# Patient Record
Sex: FEMALE | URBAN - METROPOLITAN AREA
[De-identification: names, ages, dates, MRNs, and addresses within clinical notes are randomized per-mention and may not be internally consistent; named-entity substitution may affect disease eponyms.]

---

## 2014-11-25 LAB — CRC RECOMMENDATION EXT: NORMAL

## 2017-01-18 ENCOUNTER — HISTORICAL (OUTPATIENT)
Dept: ADMINISTRATIVE | Facility: HOSPITAL | Age: 62
End: 2017-01-18

## 2017-02-07 ENCOUNTER — HISTORICAL (OUTPATIENT)
Dept: CARDIOLOGY | Facility: HOSPITAL | Age: 62
End: 2017-02-07

## 2017-03-30 ENCOUNTER — HISTORICAL (OUTPATIENT)
Dept: ADMINISTRATIVE | Facility: HOSPITAL | Age: 62
End: 2017-03-30

## 2017-03-30 LAB
ABS NEUT (OLG): 2.12 X10(3)/MCL
ALBUMIN SERPL-MCNC: 4.5 GM/DL (ref 3.4–5)
ALBUMIN/GLOB SERPL: 2 {RATIO}
ALP SERPL-CCNC: 63 UNIT/L (ref 20–120)
ALT SERPL-CCNC: 23 UNIT/L
ANISOCYTOSIS BLD QL SMEAR: ABNORMAL
AST SERPL-CCNC: 28 UNIT/L
BASOPHILS NFR BLD MANUAL: 0 %
BILIRUB SERPL-MCNC: 0.6 MG/DL
BILIRUBIN DIRECT+TOT PNL SERPL-MCNC: <0.1 MG/DL
BILIRUBIN DIRECT+TOT PNL SERPL-MCNC: >0.5 MG/DL
BUN SERPL-MCNC: 22 MG/DL (ref 7–25)
CALCIUM SERPL-MCNC: 9.1 MG/DL (ref 8.4–10.3)
CHLORIDE SERPL-SCNC: 102 MMOL/L (ref 96–110)
CHOLEST SERPL-MCNC: 287 MG/DL
CHOLEST/HDLC SERPL: 7 {RATIO} (ref 0–4.4)
CO2 SERPL-SCNC: 28 MMOL/L (ref 24–32)
CREAT SERPL-MCNC: 0.78 MG/DL (ref 0.7–1.1)
EOSINOPHIL NFR BLD MANUAL: 7 %
ERYTHROCYTE [DISTWIDTH] IN BLOOD BY AUTOMATED COUNT: 13.2 % (ref 11.5–14.5)
EST. AVERAGE GLUCOSE BLD GHB EST-MCNC: 117 MG/DL
GLOBULIN SER-MCNC: 2.7 GM/ML (ref 2.3–3.5)
GLUCOSE SERPL-MCNC: 101 MG/DL (ref 65–99)
GRANULOCYTES NFR BLD MANUAL: 40 %
HBA1C MFR BLD: 5.7 % (ref 4.7–5.6)
HCT VFR BLD AUTO: 41.1 % (ref 35–46)
HDLC SERPL-MCNC: 41 MG/DL
HGB BLD-MCNC: 13.4 GM/DL (ref 12–16)
HYPOCHROMIA BLD QL SMEAR: ABNORMAL
LDLC SERPL CALC-MCNC: 212 MG/DL (ref 0–130)
LYMPHOCYTES NFR BLD MANUAL: 31 %
LYMPHOCYTES NFR BLD MANUAL: 7 %
MACROCYTES BLD QL SMEAR: ABNORMAL
MCH RBC QN AUTO: 28.7 PG (ref 26–34)
MCHC RBC AUTO-ENTMCNC: 32.6 GM/DL (ref 31–37)
MCV RBC AUTO: 88 FL (ref 80–100)
MICROCYTES BLD QL SMEAR: ABNORMAL
MONOCYTES NFR BLD MANUAL: 12 %
NEUTS BAND NFR BLD MANUAL: 3 %
PLATELET # BLD AUTO: 251 X10(3)/MCL (ref 130–400)
PLATELET # BLD EST: ADEQUATE 10*3/UL
PMV BLD AUTO: 9.9 FL (ref 7.4–10.4)
POIKILOCYTOSIS BLD QL SMEAR: ABNORMAL
POTASSIUM SERPL-SCNC: 4.2 MMOL/L (ref 3.6–5.2)
PROT SERPL-MCNC: 7.2 GM/DL (ref 6–8)
RBC # BLD AUTO: 4.67 X10(6)/MCL (ref 4–5.2)
RBC MORPH BLD: ABNORMAL
SODIUM SERPL-SCNC: 138 MMOL/L (ref 135–146)
SPHEROCYTES BLD QL SMEAR: ABNORMAL
T4 FREE SERPL-MCNC: 0.73 NG/ML (ref 0.6–1.15)
T4 SERPL-MCNC: 6.7 MCG/DL (ref 6.09–12.23)
TRIGL SERPL-MCNC: 168 MG/DL
TSH SERPL-ACNC: 26.78 MIU/ML (ref 0.5–5)
VLDLC SERPL CALC-MCNC: 34 MG/DL
WBC # SPEC AUTO: 4.7 X10(3)/MCL (ref 4.5–11)

## 2018-01-04 ENCOUNTER — HISTORICAL (OUTPATIENT)
Dept: ADMINISTRATIVE | Facility: HOSPITAL | Age: 63
End: 2018-01-04

## 2018-01-04 LAB
ALBUMIN SERPL-MCNC: 4.1 GM/DL (ref 3.4–5)
ALBUMIN/GLOB SERPL: 1 RATIO (ref 1–2)
ALP SERPL-CCNC: 108 UNIT/L (ref 45–117)
ALT SERPL-CCNC: 64 UNIT/L (ref 12–78)
AST SERPL-CCNC: 42 UNIT/L (ref 15–37)
BILIRUB SERPL-MCNC: 0.5 MG/DL (ref 0.2–1)
BILIRUBIN DIRECT+TOT PNL SERPL-MCNC: <0.1 MG/DL
BILIRUBIN DIRECT+TOT PNL SERPL-MCNC: ABNORMAL MG/DL
BUN SERPL-MCNC: 20 MG/DL (ref 7–18)
CALCIUM SERPL-MCNC: 9.4 MG/DL (ref 8.5–10.1)
CHLORIDE SERPL-SCNC: 99 MMOL/L (ref 98–107)
CHOLEST SERPL-MCNC: 326 MG/DL
CHOLEST/HDLC SERPL: 8.2 {RATIO} (ref 0–4.4)
CO2 SERPL-SCNC: 31 MMOL/L (ref 21–32)
CREAT SERPL-MCNC: 0.9 MG/DL (ref 0.6–1.3)
EST. AVERAGE GLUCOSE BLD GHB EST-MCNC: 123 MG/DL
GLOBULIN SER-MCNC: 4.2 GM/ML (ref 2.3–3.5)
GLUCOSE SERPL-MCNC: 92 MG/DL (ref 74–106)
HBA1C MFR BLD: 5.9 % (ref 4.2–6.3)
HDLC SERPL-MCNC: 40 MG/DL
LDLC SERPL CALC-MCNC: 208 MG/DL (ref 0–130)
POTASSIUM SERPL-SCNC: 4.4 MMOL/L (ref 3.5–5.1)
PROT SERPL-MCNC: 8.3 GM/DL (ref 6.4–8.2)
SODIUM SERPL-SCNC: 138 MMOL/L (ref 136–145)
T4 FREE SERPL-MCNC: 0.67 NG/DL (ref 0.76–1.46)
T4 SERPL-MCNC: 6.6 MCG/DL (ref 4.8–13.9)
TRIGL SERPL-MCNC: 389 MG/DL
TSH SERPL-ACNC: 96.6 MIU/L (ref 0.36–3.74)
VLDLC SERPL CALC-MCNC: 78 MG/DL

## 2018-01-11 ENCOUNTER — HISTORICAL (OUTPATIENT)
Dept: FAMILY MEDICINE | Facility: CLINIC | Age: 63
End: 2018-01-11

## 2018-04-16 ENCOUNTER — HISTORICAL (OUTPATIENT)
Dept: INTERNAL MEDICINE | Facility: CLINIC | Age: 63
End: 2018-04-16

## 2018-04-16 LAB
T4 FREE SERPL-MCNC: 1.12 NG/DL (ref 0.76–1.46)
TSH SERPL-ACNC: 1.87 MIU/L (ref 0.36–3.74)

## 2018-04-26 ENCOUNTER — HISTORICAL (OUTPATIENT)
Dept: ADMINISTRATIVE | Facility: HOSPITAL | Age: 63
End: 2018-04-26

## 2018-04-26 LAB
ALBUMIN SERPL-MCNC: 3.9 GM/DL (ref 3.4–5)
ALBUMIN/GLOB SERPL: 1 RATIO (ref 1–2)
ALP SERPL-CCNC: 79 UNIT/L (ref 45–117)
ALT SERPL-CCNC: 43 UNIT/L (ref 12–78)
AST SERPL-CCNC: 30 UNIT/L (ref 15–37)
BILIRUB SERPL-MCNC: 0.3 MG/DL (ref 0.2–1)
BILIRUBIN DIRECT+TOT PNL SERPL-MCNC: <0.1 MG/DL
BILIRUBIN DIRECT+TOT PNL SERPL-MCNC: ABNORMAL MG/DL
BUN SERPL-MCNC: 29 MG/DL (ref 7–18)
CALCIUM SERPL-MCNC: 8.6 MG/DL (ref 8.5–10.1)
CHLORIDE SERPL-SCNC: 105 MMOL/L (ref 98–107)
CHOLEST SERPL-MCNC: 199 MG/DL
CHOLEST/HDLC SERPL: 6.6 {RATIO} (ref 0–4.4)
CO2 SERPL-SCNC: 27 MMOL/L (ref 21–32)
CREAT SERPL-MCNC: 0.8 MG/DL (ref 0.6–1.3)
EST. AVERAGE GLUCOSE BLD GHB EST-MCNC: 126 MG/DL
GLOBULIN SER-MCNC: 3.9 GM/ML (ref 2.3–3.5)
GLUCOSE SERPL-MCNC: 120 MG/DL (ref 74–106)
HBA1C MFR BLD: 6 % (ref 4.2–6.3)
HDLC SERPL-MCNC: 30 MG/DL
LDLC SERPL CALC-MCNC: ABNORMAL MG/DL (ref 0–130)
POTASSIUM SERPL-SCNC: 4.6 MMOL/L (ref 3.5–5.1)
PROT SERPL-MCNC: 7.8 GM/DL (ref 6.4–8.2)
SODIUM SERPL-SCNC: 141 MMOL/L (ref 136–145)
TRIGL SERPL-MCNC: 447 MG/DL
VLDLC SERPL CALC-MCNC: ABNORMAL MG/DL

## 2018-07-13 ENCOUNTER — HISTORICAL (OUTPATIENT)
Dept: ADMINISTRATIVE | Facility: HOSPITAL | Age: 63
End: 2018-07-13

## 2018-07-13 LAB
CREAT UR-MCNC: 113 MG/DL
DEPRECATED CALCIDIOL+CALCIFEROL SERPL-MC: 22.1 NG/ML (ref 30–80)
ERYTHROCYTE [SEDIMENTATION RATE] IN BLOOD: 8 MM/HR (ref 0–20)
MICROALBUMIN UR-MCNC: 11 MG/L (ref 0–19)
MICROALBUMIN/CREAT RATIO PNL UR: 9.7 MCG/MG CR (ref 0–29)
T4 FREE SERPL-MCNC: 1.11 NG/DL (ref 0.76–1.46)
T4 SERPL-MCNC: 12.6 MCG/DL (ref 4.8–13.9)
TSH SERPL-ACNC: 0.24 MIU/L (ref 0.36–3.74)

## 2018-09-21 ENCOUNTER — HISTORICAL (OUTPATIENT)
Dept: RADIOLOGY | Facility: HOSPITAL | Age: 63
End: 2018-09-21

## 2018-12-11 ENCOUNTER — HISTORICAL (OUTPATIENT)
Dept: ADMINISTRATIVE | Facility: HOSPITAL | Age: 63
End: 2018-12-11

## 2018-12-11 LAB
ABS NEUT (OLG): 3.34 X10(3)/MCL (ref 2.1–9.2)
ALBUMIN SERPL-MCNC: 4.1 GM/DL (ref 3.4–5)
ALBUMIN/GLOB SERPL: 1 RATIO (ref 1–2)
ALP SERPL-CCNC: 98 UNIT/L (ref 45–117)
ALT SERPL-CCNC: 48 UNIT/L (ref 12–78)
AST SERPL-CCNC: 36 UNIT/L (ref 15–37)
BASOPHILS # BLD AUTO: 0.03 X10(3)/MCL
BASOPHILS NFR BLD AUTO: 0 %
BILIRUB SERPL-MCNC: 0.3 MG/DL (ref 0.2–1)
BILIRUBIN DIRECT+TOT PNL SERPL-MCNC: 0.1 MG/DL
BILIRUBIN DIRECT+TOT PNL SERPL-MCNC: 0.2 MG/DL
BUN SERPL-MCNC: 19 MG/DL (ref 7–18)
CALCIUM SERPL-MCNC: 9 MG/DL (ref 8.5–10.1)
CHLORIDE SERPL-SCNC: 106 MMOL/L (ref 98–107)
CHOLEST SERPL-MCNC: 169 MG/DL
CHOLEST/HDLC SERPL: 3.4 {RATIO} (ref 0–4.4)
CO2 SERPL-SCNC: 28 MMOL/L (ref 21–32)
CREAT SERPL-MCNC: 0.8 MG/DL (ref 0.6–1.3)
EOSINOPHIL # BLD AUTO: 0.51 X10(3)/MCL
EOSINOPHIL NFR BLD AUTO: 8 %
ERYTHROCYTE [DISTWIDTH] IN BLOOD BY AUTOMATED COUNT: 13.8 % (ref 11.5–14.5)
EST. AVERAGE GLUCOSE BLD GHB EST-MCNC: 120 MG/DL
GLOBULIN SER-MCNC: 4 GM/ML (ref 2.3–3.5)
GLUCOSE SERPL-MCNC: 96 MG/DL (ref 74–106)
HBA1C MFR BLD: 5.8 % (ref 4.2–6.3)
HCT VFR BLD AUTO: 42.3 % (ref 35–46)
HDLC SERPL-MCNC: 49 MG/DL
HGB BLD-MCNC: 13.8 GM/DL (ref 12–16)
IMM GRANULOCYTES # BLD AUTO: 0.02 10*3/UL
IMM GRANULOCYTES NFR BLD AUTO: 0 %
LDLC SERPL CALC-MCNC: 105 MG/DL (ref 0–130)
LYMPHOCYTES # BLD AUTO: 2.07 X10(3)/MCL
LYMPHOCYTES NFR BLD AUTO: 32 % (ref 13–40)
MCH RBC QN AUTO: 28.9 PG (ref 26–34)
MCHC RBC AUTO-ENTMCNC: 32.6 GM/DL (ref 31–37)
MCV RBC AUTO: 88.5 FL (ref 80–100)
MONOCYTES # BLD AUTO: 0.49 X10(3)/MCL
MONOCYTES NFR BLD AUTO: 8 % (ref 4–12)
NEUTROPHILS # BLD AUTO: 3.34 X10(3)/MCL
NEUTROPHILS NFR BLD AUTO: 52 X10(3)/MCL
PLATELET # BLD AUTO: 249 X10(3)/MCL (ref 130–400)
PMV BLD AUTO: 10.2 FL (ref 7.4–10.4)
POTASSIUM SERPL-SCNC: 3.6 MMOL/L (ref 3.5–5.1)
PROT SERPL-MCNC: 8.1 GM/DL (ref 6.4–8.2)
RBC # BLD AUTO: 4.78 X10(6)/MCL (ref 4–5.2)
SODIUM SERPL-SCNC: 140 MMOL/L (ref 136–145)
T4 FREE SERPL-MCNC: 0.92 NG/DL (ref 0.76–1.46)
TRIGL SERPL-MCNC: 76 MG/DL
TSH SERPL-ACNC: 18.3 MIU/L (ref 0.36–3.74)
VLDLC SERPL CALC-MCNC: 15 MG/DL
WBC # SPEC AUTO: 6.5 X10(3)/MCL (ref 4.5–11)

## 2019-01-15 ENCOUNTER — HISTORICAL (OUTPATIENT)
Dept: FAMILY MEDICINE | Facility: CLINIC | Age: 64
End: 2019-01-15

## 2019-01-15 LAB
ALBUMIN SERPL-MCNC: 3.9 GM/DL (ref 3.4–5)
ALBUMIN/GLOB SERPL: 1 RATIO (ref 1–2)
ALP SERPL-CCNC: 100 UNIT/L (ref 45–117)
ALT SERPL-CCNC: 98 UNIT/L (ref 12–78)
AST SERPL-CCNC: 60 UNIT/L (ref 15–37)
BILIRUB SERPL-MCNC: 0.3 MG/DL (ref 0.2–1)
BILIRUBIN DIRECT+TOT PNL SERPL-MCNC: <0.1 MG/DL
BILIRUBIN DIRECT+TOT PNL SERPL-MCNC: ABNORMAL MG/DL
BUN SERPL-MCNC: 25 MG/DL (ref 7–18)
CALCIUM SERPL-MCNC: 9 MG/DL (ref 8.5–10.1)
CHLORIDE SERPL-SCNC: 106 MMOL/L (ref 98–107)
CO2 SERPL-SCNC: 29 MMOL/L (ref 21–32)
CREAT SERPL-MCNC: 0.8 MG/DL (ref 0.6–1.3)
GLOBULIN SER-MCNC: 4 GM/ML (ref 2.3–3.5)
GLUCOSE SERPL-MCNC: 106 MG/DL (ref 74–106)
POTASSIUM SERPL-SCNC: 3.9 MMOL/L (ref 3.5–5.1)
PROT SERPL-MCNC: 7.9 GM/DL (ref 6.4–8.2)
SODIUM SERPL-SCNC: 137 MMOL/L (ref 136–145)
T4 FREE SERPL-MCNC: 1.17 NG/DL (ref 0.76–1.46)
TSH SERPL-ACNC: 1.24 MIU/L (ref 0.36–3.74)

## 2019-07-01 ENCOUNTER — HISTORICAL (OUTPATIENT)
Dept: FAMILY MEDICINE | Facility: CLINIC | Age: 64
End: 2019-07-01

## 2019-07-01 LAB
ALBUMIN SERPL-MCNC: 4.1 GM/DL (ref 3.4–5)
ALBUMIN/GLOB SERPL: 1.1 RATIO (ref 1.1–2)
ALP SERPL-CCNC: 107 UNIT/L (ref 45–117)
ALT SERPL-CCNC: 122 UNIT/L (ref 12–78)
AST SERPL-CCNC: 100 UNIT/L (ref 15–37)
BILIRUB SERPL-MCNC: 0.4 MG/DL (ref 0.2–1)
BILIRUBIN DIRECT+TOT PNL SERPL-MCNC: 0.2 MG/DL
BILIRUBIN DIRECT+TOT PNL SERPL-MCNC: 0.2 MG/DL
BUN SERPL-MCNC: 28 MG/DL (ref 7–18)
CALCIUM SERPL-MCNC: 9.1 MG/DL (ref 8.5–10.1)
CHLORIDE SERPL-SCNC: 110 MMOL/L (ref 98–107)
CO2 SERPL-SCNC: 28 MMOL/L (ref 21–32)
CREAT SERPL-MCNC: 1 MG/DL (ref 0.6–1.3)
DEPRECATED CALCIDIOL+CALCIFEROL SERPL-MC: 33.14 NG/ML (ref 30–80)
GLOBULIN SER-MCNC: 3.9 GM/ML (ref 2.3–3.5)
GLUCOSE SERPL-MCNC: 102 MG/DL (ref 74–106)
POTASSIUM SERPL-SCNC: 4 MMOL/L (ref 3.5–5.1)
PROT SERPL-MCNC: 8 GM/DL (ref 6.4–8.2)
SODIUM SERPL-SCNC: 141 MMOL/L (ref 136–145)
T4 FREE SERPL-MCNC: 1.39 NG/DL (ref 0.76–1.46)
TSH SERPL-ACNC: 0.21 MIU/L (ref 0.36–3.74)

## 2019-07-22 ENCOUNTER — HISTORICAL (OUTPATIENT)
Dept: FAMILY MEDICINE | Facility: CLINIC | Age: 64
End: 2019-07-22

## 2019-07-22 ENCOUNTER — HISTORICAL (OUTPATIENT)
Dept: RADIOLOGY | Facility: HOSPITAL | Age: 64
End: 2019-07-22

## 2019-07-22 LAB
ALBUMIN SERPL-MCNC: 4 GM/DL (ref 3.4–5)
ALBUMIN/GLOB SERPL: 1 RATIO (ref 1.1–2)
ALP SERPL-CCNC: 119 UNIT/L (ref 45–117)
ALT SERPL-CCNC: 41 UNIT/L (ref 12–78)
AST SERPL-CCNC: 24 UNIT/L (ref 15–37)
BILIRUB SERPL-MCNC: 0.5 MG/DL (ref 0.2–1)
BILIRUBIN DIRECT+TOT PNL SERPL-MCNC: 0.2 MG/DL
BILIRUBIN DIRECT+TOT PNL SERPL-MCNC: 0.3 MG/DL
BUN SERPL-MCNC: 17 MG/DL (ref 7–18)
CALCIUM SERPL-MCNC: 9.2 MG/DL (ref 8.5–10.1)
CHLORIDE SERPL-SCNC: 106 MMOL/L (ref 98–107)
CHOLEST SERPL-MCNC: 223 MG/DL
CHOLEST/HDLC SERPL: 5.4 {RATIO} (ref 0–4.4)
CO2 SERPL-SCNC: 30 MMOL/L (ref 21–32)
CREAT SERPL-MCNC: 0.7 MG/DL (ref 0.6–1.3)
FERRITIN SERPL-MCNC: 83.3 NG/ML (ref 8–388)
GLOBULIN SER-MCNC: 4.1 GM/ML (ref 2.3–3.5)
GLUCOSE SERPL-MCNC: 98 MG/DL (ref 74–106)
HAV IGM SERPL QL IA: NONREACTIVE
HBV CORE IGM SERPL QL IA: NONREACTIVE
HBV SURFACE AG SERPL QL IA: NEGATIVE
HCV AB SERPL QL IA: NONREACTIVE
HDLC SERPL-MCNC: 41 MG/DL
IRON SATN MFR SERPL: 24.1 % (ref 15–50)
IRON SERPL-MCNC: 76 MCG/DL (ref 50–170)
LDLC SERPL CALC-MCNC: 139 MG/DL (ref 0–130)
POTASSIUM SERPL-SCNC: 3.9 MMOL/L (ref 3.5–5.1)
PROT SERPL-MCNC: 8.1 GM/DL (ref 6.4–8.2)
SODIUM SERPL-SCNC: 141 MMOL/L (ref 136–145)
TIBC SERPL-MCNC: 316 MCG/DL (ref 250–450)
TRANSFERRIN SERPL-MCNC: 263 MG/DL (ref 200–360)
TRIGL SERPL-MCNC: 215 MG/DL
VLDLC SERPL CALC-MCNC: 43 MG/DL

## 2019-11-06 ENCOUNTER — HISTORICAL (OUTPATIENT)
Dept: FAMILY MEDICINE | Facility: CLINIC | Age: 64
End: 2019-11-06

## 2019-11-06 LAB
ABS NEUT (OLG): 4.47 X10(3)/MCL (ref 2.1–9.2)
ALBUMIN SERPL-MCNC: 3.9 GM/DL (ref 3.4–5)
ALBUMIN/GLOB SERPL: 1 RATIO (ref 1.1–2)
ALP SERPL-CCNC: 112 UNIT/L (ref 45–117)
ALT SERPL-CCNC: 37 UNIT/L (ref 12–78)
AST SERPL-CCNC: 23 UNIT/L (ref 15–37)
BASOPHILS # BLD AUTO: 0 X10(3)/MCL (ref 0–0.2)
BASOPHILS NFR BLD AUTO: 0 %
BILIRUB SERPL-MCNC: 0.3 MG/DL (ref 0.2–1)
BILIRUBIN DIRECT+TOT PNL SERPL-MCNC: 0.1 MG/DL (ref 0–0.2)
BILIRUBIN DIRECT+TOT PNL SERPL-MCNC: 0.2 MG/DL
BUN SERPL-MCNC: 20 MG/DL (ref 7–18)
CALCIUM SERPL-MCNC: 9 MG/DL (ref 8.5–10.1)
CHLORIDE SERPL-SCNC: 106 MMOL/L (ref 98–107)
CO2 SERPL-SCNC: 26 MMOL/L (ref 21–32)
CREAT SERPL-MCNC: 0.8 MG/DL (ref 0.6–1.3)
DEPRECATED CALCIDIOL+CALCIFEROL SERPL-MC: 49.84 NG/ML (ref 30–80)
EOSINOPHIL # BLD AUTO: 0.3 X10(3)/MCL (ref 0–0.9)
EOSINOPHIL NFR BLD AUTO: 4 %
ERYTHROCYTE [DISTWIDTH] IN BLOOD BY AUTOMATED COUNT: 13.8 % (ref 11.5–14.5)
GLOBULIN SER-MCNC: 3.9 GM/ML (ref 2.3–3.5)
GLUCOSE SERPL-MCNC: 144 MG/DL (ref 74–106)
HCT VFR BLD AUTO: 43 % (ref 35–46)
HGB BLD-MCNC: 13.8 GM/DL (ref 12–16)
IMM GRANULOCYTES # BLD AUTO: 0.02 10*3/UL
IMM GRANULOCYTES NFR BLD AUTO: 0 %
LYMPHOCYTES # BLD AUTO: 2.4 X10(3)/MCL (ref 0.6–4.6)
LYMPHOCYTES NFR BLD AUTO: 31 %
MCH RBC QN AUTO: 28.5 PG (ref 26–34)
MCHC RBC AUTO-ENTMCNC: 32.1 GM/DL (ref 31–37)
MCV RBC AUTO: 88.7 FL (ref 80–100)
MONOCYTES # BLD AUTO: 0.5 X10(3)/MCL (ref 0.1–1.3)
MONOCYTES NFR BLD AUTO: 7 %
NEUTROPHILS # BLD AUTO: 4.47 X10(3)/MCL (ref 2.1–9.2)
NEUTROPHILS NFR BLD AUTO: 58 %
PLATELET # BLD AUTO: 231 X10(3)/MCL (ref 130–400)
PMV BLD AUTO: 10 FL (ref 7.4–10.4)
POTASSIUM SERPL-SCNC: 4.2 MMOL/L (ref 3.5–5.1)
PROT SERPL-MCNC: 7.8 GM/DL (ref 6.4–8.2)
RBC # BLD AUTO: 4.85 X10(6)/MCL (ref 4–5.2)
SODIUM SERPL-SCNC: 140 MMOL/L (ref 136–145)
T3FREE SERPL-MCNC: 2.72 PG/ML (ref 2.18–3.98)
T4 FREE SERPL-MCNC: 1.44 NG/DL (ref 0.76–1.46)
TSH SERPL-ACNC: 0.06 MIU/L (ref 0.36–3.74)
WBC # SPEC AUTO: 7.8 X10(3)/MCL (ref 4.5–11)

## 2020-10-27 ENCOUNTER — HISTORICAL (OUTPATIENT)
Dept: FAMILY MEDICINE | Facility: CLINIC | Age: 65
End: 2020-10-27

## 2020-10-27 LAB
ABS NEUT (OLG): 6.55 X10(3)/MCL (ref 2.1–9.2)
ALBUMIN SERPL-MCNC: 4.2 GM/DL (ref 3.4–4.8)
ALBUMIN/GLOB SERPL: 1 RATIO (ref 1.1–2)
ALP SERPL-CCNC: 120 UNIT/L (ref 40–150)
ALT SERPL-CCNC: 18 UNIT/L (ref 0–55)
APPEARANCE, UA: CLEAR
AST SERPL-CCNC: 20 UNIT/L (ref 5–34)
BACTERIA #/AREA URNS AUTO: ABNORMAL /HPF
BASOPHILS # BLD AUTO: 0 X10(3)/MCL (ref 0–0.2)
BASOPHILS NFR BLD AUTO: 0 %
BILIRUB SERPL-MCNC: 0.5 MG/DL
BILIRUB UR QL STRIP: NEGATIVE
BILIRUBIN DIRECT+TOT PNL SERPL-MCNC: 0.2 MG/DL (ref 0–0.5)
BILIRUBIN DIRECT+TOT PNL SERPL-MCNC: 0.3 MG/DL (ref 0–0.8)
BUN SERPL-MCNC: 17 MG/DL (ref 9.8–20.1)
CALCIUM SERPL-MCNC: 9.4 MG/DL (ref 8.4–10.2)
CHLORIDE SERPL-SCNC: 102 MMOL/L (ref 98–107)
CHOLEST SERPL-MCNC: 147 MG/DL
CHOLEST/HDLC SERPL: 3 {RATIO} (ref 0–5)
CO2 SERPL-SCNC: 28 MMOL/L (ref 23–31)
COLOR UR: YELLOW
CREAT SERPL-MCNC: 0.82 MG/DL (ref 0.55–1.02)
EOSINOPHIL # BLD AUTO: 0.5 X10(3)/MCL (ref 0–0.9)
EOSINOPHIL NFR BLD AUTO: 5 %
ERYTHROCYTE [DISTWIDTH] IN BLOOD BY AUTOMATED COUNT: 13.8 % (ref 11.5–14.5)
EST. AVERAGE GLUCOSE BLD GHB EST-MCNC: 119.8 MG/DL
GLOBULIN SER-MCNC: 4.1 GM/DL (ref 2.4–3.5)
GLUCOSE (UA): NEGATIVE
GLUCOSE SERPL-MCNC: 113 MG/DL (ref 82–115)
HBA1C MFR BLD: 5.8 %
HCT VFR BLD AUTO: 42.9 % (ref 35–46)
HDLC SERPL-MCNC: 43 MG/DL (ref 35–60)
HGB BLD-MCNC: 14.1 GM/DL (ref 12–16)
HGB UR QL STRIP: NEGATIVE
HYALINE CASTS #/AREA URNS LPF: 0 /LPF
IMM GRANULOCYTES # BLD AUTO: 0.02 10*3/UL
IMM GRANULOCYTES NFR BLD AUTO: 0 %
KETONES UR QL STRIP: NEGATIVE
LDLC SERPL CALC-MCNC: 80 MG/DL (ref 50–140)
LEUKOCYTE ESTERASE UR QL STRIP: NEGATIVE
LYMPHOCYTES # BLD AUTO: 1.8 X10(3)/MCL (ref 0.6–4.6)
LYMPHOCYTES NFR BLD AUTO: 19 %
MCH RBC QN AUTO: 28.9 PG (ref 26–34)
MCHC RBC AUTO-ENTMCNC: 32.9 GM/DL (ref 31–37)
MCV RBC AUTO: 87.9 FL (ref 80–100)
MONOCYTES # BLD AUTO: 0.6 X10(3)/MCL (ref 0.1–1.3)
MONOCYTES NFR BLD AUTO: 6 %
NEUTROPHILS # BLD AUTO: 6.55 X10(3)/MCL (ref 2.1–9.2)
NEUTROPHILS NFR BLD AUTO: 69 %
NITRITE UR QL STRIP: NEGATIVE
PH UR STRIP: 6 [PH] (ref 4.5–8)
PLATELET # BLD AUTO: 245 X10(3)/MCL (ref 130–400)
PMV BLD AUTO: 10 FL (ref 7.4–10.4)
POTASSIUM SERPL-SCNC: 3.8 MMOL/L (ref 3.5–5.1)
PROT SERPL-MCNC: 8.3 GM/DL (ref 5.8–7.6)
PROT UR QL STRIP: 20 MG/DL
RBC # BLD AUTO: 4.88 X10(6)/MCL (ref 4–5.2)
RBC #/AREA URNS AUTO: ABNORMAL /HPF
SODIUM SERPL-SCNC: 138 MMOL/L (ref 136–145)
SP GR UR STRIP: 1.02 (ref 1–1.03)
SQUAMOUS #/AREA URNS LPF: ABNORMAL /LPF
T4 FREE SERPL-MCNC: 1.25 NG/DL (ref 0.7–1.48)
TRIGL SERPL-MCNC: 122 MG/DL (ref 37–140)
TSH SERPL-ACNC: 0.25 UIU/ML (ref 0.35–4.94)
UROBILINOGEN UR STRIP-ACNC: NORMAL
VLDLC SERPL CALC-MCNC: 24 MG/DL
WBC # SPEC AUTO: 9.5 X10(3)/MCL (ref 4.5–11)
WBC #/AREA URNS AUTO: ABNORMAL /HPF

## 2021-04-20 ENCOUNTER — HISTORICAL (OUTPATIENT)
Dept: FAMILY MEDICINE | Facility: CLINIC | Age: 66
End: 2021-04-20

## 2021-04-20 LAB
ALBUMIN SERPL-MCNC: 4.2 GM/DL (ref 3.4–4.8)
ALBUMIN/GLOB SERPL: 1.2 RATIO (ref 1.1–2)
ALP SERPL-CCNC: 105 UNIT/L (ref 40–150)
ALT SERPL-CCNC: 24 UNIT/L (ref 0–55)
AST SERPL-CCNC: 23 UNIT/L (ref 5–34)
BILIRUB SERPL-MCNC: 0.9 MG/DL
BILIRUBIN DIRECT+TOT PNL SERPL-MCNC: 0.3 MG/DL (ref 0–0.5)
BILIRUBIN DIRECT+TOT PNL SERPL-MCNC: 0.6 MG/DL (ref 0–0.8)
BUN SERPL-MCNC: 14.7 MG/DL (ref 9.8–20.1)
CALCIUM SERPL-MCNC: 9.9 MG/DL (ref 8.4–10.2)
CHLORIDE SERPL-SCNC: 104 MMOL/L (ref 98–107)
CHOLEST SERPL-MCNC: 164 MG/DL
CHOLEST/HDLC SERPL: 4 {RATIO} (ref 0–5)
CO2 SERPL-SCNC: 27 MMOL/L (ref 23–31)
CREAT SERPL-MCNC: 0.78 MG/DL (ref 0.55–1.02)
EST. AVERAGE GLUCOSE BLD GHB EST-MCNC: 116.9 MG/DL
GLOBULIN SER-MCNC: 3.5 GM/DL (ref 2.4–3.5)
GLUCOSE SERPL-MCNC: 113 MG/DL (ref 82–115)
HBA1C MFR BLD: 5.7 %
HDLC SERPL-MCNC: 39 MG/DL (ref 35–60)
LDLC SERPL CALC-MCNC: 99 MG/DL (ref 50–140)
POTASSIUM SERPL-SCNC: 3.6 MMOL/L (ref 3.5–5.1)
PROT SERPL-MCNC: 7.7 GM/DL (ref 5.8–7.6)
SODIUM SERPL-SCNC: 140 MMOL/L (ref 136–145)
T3FREE SERPL-MCNC: 3.34 PG/ML (ref 1.71–3.71)
T4 FREE SERPL-MCNC: 1.28 NG/DL (ref 0.7–1.48)
TRIGL SERPL-MCNC: 128 MG/DL (ref 37–140)
TSH SERPL-ACNC: 0.06 UIU/ML (ref 0.35–4.94)
VLDLC SERPL CALC-MCNC: 26 MG/DL

## 2021-05-04 ENCOUNTER — HISTORICAL (OUTPATIENT)
Dept: RADIOLOGY | Facility: HOSPITAL | Age: 66
End: 2021-05-04

## 2021-06-07 ENCOUNTER — HISTORICAL (OUTPATIENT)
Dept: FAMILY MEDICINE | Facility: CLINIC | Age: 66
End: 2021-06-07

## 2021-06-07 LAB
T3FREE SERPL-MCNC: 3.3 PG/ML (ref 1.71–3.71)
T4 FREE SERPL-MCNC: 1.32 NG/DL (ref 0.7–1.48)
TSH SERPL-ACNC: 0.05 UIU/ML (ref 0.35–4.94)

## 2021-10-12 ENCOUNTER — HISTORICAL (OUTPATIENT)
Dept: ADMINISTRATIVE | Facility: HOSPITAL | Age: 66
End: 2021-10-12

## 2021-10-12 LAB
DEPRECATED CALCIDIOL+CALCIFEROL SERPL-MC: 71.7 NG/ML (ref 30–80)
T4 FREE SERPL-MCNC: 1.1 NG/DL (ref 0.7–1.48)
TSH SERPL-ACNC: 0.31 UIU/ML (ref 0.35–4.94)

## 2022-01-13 ENCOUNTER — HISTORICAL (OUTPATIENT)
Dept: FAMILY MEDICINE | Facility: CLINIC | Age: 67
End: 2022-01-13

## 2022-01-13 LAB
ABS NEUT (OLG): 3.55 X10(3)/MCL (ref 2.1–9.2)
ALBUMIN SERPL-MCNC: 4.3 GM/DL (ref 3.4–4.8)
ALBUMIN/GLOB SERPL: 1.1 RATIO (ref 1.1–2)
ALP SERPL-CCNC: 122 UNIT/L (ref 40–150)
ALT SERPL-CCNC: 27 UNIT/L (ref 0–55)
AST SERPL-CCNC: 24 UNIT/L (ref 5–34)
BASOPHILS # BLD AUTO: 0 X10(3)/MCL (ref 0–0.2)
BASOPHILS NFR BLD AUTO: 1 %
BILIRUB SERPL-MCNC: 0.7 MG/DL
BILIRUBIN DIRECT+TOT PNL SERPL-MCNC: 0.3 MG/DL (ref 0–0.5)
BILIRUBIN DIRECT+TOT PNL SERPL-MCNC: 0.4 MG/DL (ref 0–0.8)
BUN SERPL-MCNC: 13.9 MG/DL (ref 9.8–20.1)
CALCIUM SERPL-MCNC: 9.8 MG/DL (ref 8.7–10.5)
CHLORIDE SERPL-SCNC: 106 MMOL/L (ref 98–107)
CHOLEST SERPL-MCNC: 150 MG/DL
CHOLEST/HDLC SERPL: 4 {RATIO} (ref 0–5)
CO2 SERPL-SCNC: 25 MMOL/L (ref 23–31)
CREAT SERPL-MCNC: 0.78 MG/DL (ref 0.55–1.02)
EOSINOPHIL # BLD AUTO: 0.4 X10(3)/MCL (ref 0–0.9)
EOSINOPHIL NFR BLD AUTO: 5 %
ERYTHROCYTE [DISTWIDTH] IN BLOOD BY AUTOMATED COUNT: 14.1 % (ref 11.5–14.5)
EST. AVERAGE GLUCOSE BLD GHB EST-MCNC: 122.6 MG/DL
GLOBULIN SER-MCNC: 3.8 GM/DL (ref 2.4–3.5)
GLUCOSE SERPL-MCNC: 99 MG/DL (ref 82–115)
HBA1C MFR BLD: 5.9 %
HCT VFR BLD AUTO: 43.5 % (ref 35–46)
HDLC SERPL-MCNC: 39 MG/DL (ref 35–60)
HGB BLD-MCNC: 14.2 GM/DL (ref 12–16)
IMM GRANULOCYTES # BLD AUTO: 0.02 10*3/UL
IMM GRANULOCYTES NFR BLD AUTO: 0 %
LDLC SERPL CALC-MCNC: 94 MG/DL (ref 50–140)
LYMPHOCYTES # BLD AUTO: 2.5 X10(3)/MCL (ref 0.6–4.6)
LYMPHOCYTES NFR BLD AUTO: 35 %
MCH RBC QN AUTO: 29 PG (ref 26–34)
MCHC RBC AUTO-ENTMCNC: 32.6 GM/DL (ref 31–37)
MCV RBC AUTO: 89 FL (ref 80–100)
MONOCYTES # BLD AUTO: 0.6 X10(3)/MCL (ref 0.1–1.3)
MONOCYTES NFR BLD AUTO: 9 %
NEUTROPHILS # BLD AUTO: 3.55 X10(3)/MCL (ref 2.1–9.2)
NEUTROPHILS NFR BLD AUTO: 50 %
NRBC BLD AUTO-RTO: 0 % (ref 0–0.2)
PLATELET # BLD AUTO: 284 X10(3)/MCL (ref 130–400)
PMV BLD AUTO: 9.7 FL (ref 7.4–10.4)
POTASSIUM SERPL-SCNC: 4.1 MMOL/L (ref 3.5–5.1)
PROT SERPL-MCNC: 8.1 GM/DL (ref 5.8–7.6)
RBC # BLD AUTO: 4.89 X10(6)/MCL (ref 4–5.2)
SODIUM SERPL-SCNC: 139 MMOL/L (ref 136–145)
TRIGL SERPL-MCNC: 86 MG/DL (ref 37–140)
VLDLC SERPL CALC-MCNC: 17 MG/DL
WBC # SPEC AUTO: 7.1 X10(3)/MCL (ref 4.5–11)

## 2022-04-10 ENCOUNTER — HISTORICAL (OUTPATIENT)
Dept: ADMINISTRATIVE | Facility: HOSPITAL | Age: 67
End: 2022-04-10
Payer: MEDICARE

## 2022-04-27 VITALS
SYSTOLIC BLOOD PRESSURE: 115 MMHG | WEIGHT: 170.19 LBS | BODY MASS INDEX: 25.21 KG/M2 | OXYGEN SATURATION: 96 % | DIASTOLIC BLOOD PRESSURE: 71 MMHG | HEIGHT: 69 IN

## 2022-04-30 NOTE — PROGRESS NOTES
Patient:   Elsy Hou             MRN: [id number]            FIN: [id number]               Age:   64 years     Sex:  Female     :  1955   Associated Diagnoses:   None   Author:   Keyona Austin DO-My Vanessa      Called and Spoke with the patient today at 1410 in regards to her laboratory work.  Discussed with the patient that she has elevated liver enzymes with AST of 100 and ALT of 122.  Recommend that patient follows up with a physician to have further work-up to further evaluate.  Risks and benefits discussed with the patient.  Patient refuses all further work-up for her elevated lover enzymes at this time because patient is currently out of time.  Patient states she is going to [place] to watch her grandchildren and can come back to see me in approximately 2 weeks.  Also informed the patient that she may see a physician out of state to be further evaluated.  Patient verbalized understanding and continues to refuse further work-up for her elevated liver enzymes at this time.  Patient informed of the risks of refusal and patient verbalized understanding.  ED precautions were given as well and patient verbalized understanding.

## 2022-04-30 NOTE — PROGRESS NOTES
"   Patient:   Elsy Hou             MRN: [id number]            FIN: [id number]               Age:   63 years     Sex:  Female     :  1955   Associated Diagnoses:   Hyperlipidemia; Hypertension; Hypothyroidism; Low back pain; Morning joint stiffness of hip; Need for vaccination; Post-menopausal; Prediabetes; Screening for osteoporosis   Author:   Abigail Ortiz MD      PCP: Team Dennys Tadeo team: Sarmad      Subjective   Chief complaint 2018 7:43 CDT       F/U for thyroid problem.     63-year-old -American female with past medical history of hyperlipidemia, hypertension, hypothyroidism, and prediabetes mellitus type II.    HTN: controlled. No home BP cuff. No HA or dizziness or Blurry vision.   HLD: was on red yeast rice but stopped taking it. Patient now resided in [place] and so did not follow up after taking it. She gets LE cramping with her statin. has not tried a fibrate. Is amenable to taking it. Of note on her last set of labs patient with markedly elevated triglycerides with an LDL that was incalculable due to this.  Patient states strong family history of uncontrolled HLD in her mother.   Hypothyroidism: TSH and free T4 were within normal limits at her last visit. She was  increased to 100 mcg of Synthroid prior to that. No complaints of constipation, hairloss, or cold intolerance, tachycardia, or sweating. She is however very "active" and states she has had 3 episodes of LE edema (sporadically) at the end of the day.   Arthritis: Patient states she has been having sever stiffness in the AM when she gets out of bed. She reports sever low back pain and has trouble getting up and walking. She shuffles out of bed. Feels like her body "broke" once she hit 63.   HM: Turned 63  since last visit.   Due for DEXA and PCV 13.     ROS: Denies CP, SOB, N/V/D, HA, Dizziness, Fever or Chills         Health Status   Current medications:  (Selected)   Prescriptions  Prescribed  Red Yeast Rice " 600 mg oral capsule: 1,200 mg = 2 cap(s), Oral, Daily, # 60 cap(s), 6 Refill(s), Pharmacy: [facility]  amlodipine 10 mg oral tablet: 10 mg = 1 tab(s), Oral, Daily, # 90 tab(s), 2 Refill(s)  aspirin 81 mg oral tablet: 81 mg = 1 tab(s), Oral, Daily, # 90 tab(s), 0 Refill(s), Pharmacy: [facility]  levothyroxine 100 mcg (0.1 mg) oral tablet: 100 mcg = 1 tab(s), Oral, Daily, # 90 tab(s), 3 Refill(s),    Home Medications (4) Active  amlodipine 10 mg oral tablet 10 mg = 1 tab(s), Oral, Daily  aspirin 81 mg oral tablet 81 mg = 1 tab(s), Oral, Daily  levothyroxine 100 mcg (0.1 mg) oral tablet 100 mcg = 1 tab(s), Oral, Daily  Red Yeast Rice 600 mg oral capsule 1,200 mg = 2 cap(s), Oral, Daily     Problem list:    All Problems  At risk of pressure sore / SNOMED CT 906333087 / Confirmed  Hypercholesterolemia / SNOMED CT [id number] / Confirmed  HLD (hyperlipidemia)(  Confirmed  ) / SNOMED CT 11895933 / Confirmed  Hypertension / SNOMED CT 8641772927 / Confirmed  Cystocele, midline(  Confirmed  ) / SNOMED CT 6313840280 / Confirmed      Objective      Vital Signs (last 24 hrs)_____  Last Charted___________  Temp Oral     36.7 DegC  (JUL 13 07:43)  Heart Rate Peripheral   82 bpm  (JUL 13 07:43)  Resp Rate         12 br/min  (JUL 13 07:43)  SBP      121 mmHg  (JUL 13 07:43)  DBP      78 mmHg  (JUL 13 07:43)  SpO2      97 %  (JUL 13 07:43)  Weight      70.15 kg  (JUL 13 07:43)  Height      175 cm  (JUL 13 07:43)  BMI      22.91  (JUL 13 07:43)     General:  Alert and oriented, No acute distress.    Eye:  Extraocular movements are intact, Normal conjunctiva.    HENT:  Normocephalic, Oral mucosa is moist.    Neck:  Supple, Non-tender, No thyromegaly.    Respiratory:  Lungs are clear to auscultation, Respirations are non-labored, Breath sounds are equal, Symmetrical chest wall expansion.    Cardiovascular:  Normal rate, Regular rhythm, No murmur, Good pulses equal in all extremities, No  edema.    Gastrointestinal:  Soft, Non-tender, Non-distended, Normal bowel sounds.    Musculoskeletal:  Normal range of motion, Normal strength, No swelling.    Integumentary:  Warm, Dry, No rash.    Neurologic:  Normal sensory, Normal motor function.    Psychiatric:  Cooperative, Appropriate mood & affect, verbose.       Impression and Plan   Assessment and Plan:          Diagnosis: Hyperlipidemia (XCH07-AM E78.5), Hypertension (JSA30-TR I10), Hypothyroidism (MKI59-OI E03.9), Low back pain (FIB61-RR M54.5), Morning joint stiffness of hip (RGN59-WJ M25.659), Need for vaccination (TKO24-WP Z23), Post-menopausal (RWE22-XV Z78.0), Prediabetes (YPK69-UR R73.03), Screening for osteoporosis (CQM76-QY Z13.820).    Orders      (Selected)   Outpatient Orders  Ordered (Dispatched)  ESR: Routine collect, 07/13/18 9:06:00 CDT, Blood, Stop date 07/13/18 9:06:00 CDT, Lab Collect, Venous Draw, Low back pain  Morning joint stiffness of hip, 07/13/18 8:26:00 CDT  T4: Routine collect, 07/13/18 9:06:00 CDT, Blood, Stop date 07/13/18 9:06:00 CDT, Lab Collect, Venous Draw, Hypothyroidism, 07/13/18 8:21:00 CDT  TSH: Routine collect, 07/13/18 9:06:00 CDT, Blood, Stop date 07/13/18 9:06:00 CDT, Lab Collect, Venous Draw, Hypothyroidism, 07/13/18 8:21:00 CDT  Urine Microalbumin Level: Routine collect, Urine, 07/13/18 9:06:00 CDT by CLIENT, Stop date 07/13/18 9:06:00 CDT, Nurse collect, Urine, Hypertension, ~INHERIT  Vitamin D, 25-Hydroxy Level: Routine collect, 07/13/18 9:06:00 CDT, Blood, Stop date 07/13/18 9:06:00 CDT, Lab Collect, Venous Draw, Low back pain  Post-menopausal, 07/13/18 8:22:00 CDT  Ordered (Exam Completed)  XR Spine Lumbar 2 or 3 Views: Routine, 07/13/18 9:41:00 CDT, Back Pain, severe low back pain, None, Ambulatory, Patient Has IV?, Rad Type, Low back pain, Not Scheduled, 07/13/18 9:41:00 CDT  Completed  pneumococcal 13-valent conjugate vaccine: 0.5 mL, form: Injection, IM, Once-Unscheduled, first dose 07/13/18 8:41:00  CDT  Future (On Hold)  XR Bone Density Complete: Routine, 07/13/18 8:22:00 CDT, Screening, Screening, None, Ambulatory, Patient Has IV?, Rad Type, Order for future visit, Screening for osteoporosis, Schedule this test, [facility], 07/13/18 8:22:00 CDT  Prescriptions  Prescribed  amlodipine 10 mg oral tablet: 10 mg = 1 tab(s), Oral, Daily, # 90 tab(s), 2 Refill(s), Pharmacy: [facility]  fenofibrate 120 mg oral tablet: 120 mg = 1 tab(s), Oral, Daily, # 90 tab(s), 1 Refill(s)  levothyroxine 100 mcg (0.1 mg) oral tablet: 100 mcg = 1 tab(s), Oral, Daily, # 90 tab(s), 3 Refill(s), Pharmacy: [facility].     .    HLD: Start Fenofibrate. Repeat FLP in 1 month. Consider adding Zetia.   HTN: Controlled. Continue NOrvasc. Monitor for LE Edema. UrMicroablumin ordered today. EKG on file.   PreDM: patient wishes to control with diet and exercise. continue to consel. consider low dose metformin for preventitive.   Hypothyroidism: check TSH and t4 in face of LE edema and apparent hyperactivity. adjust dose as necessary.   Arthritis: Vitamin d level in face in low back pain and menopausal state. Concern for this morning stiffness will get ESR as well. XR spine lumbar to investigate this acute worsening that is present in the AM.   HM: Dexa and PCV 13. Rest of HM to be handled by new PCP.   Patient to follow up with Dr. Austin in 1 month.

## 2022-05-03 NOTE — HISTORICAL [facility] CERNER
This is a historical note converted from Kalyn. Formatting and pictures may have been removed.  Please reference Cerbrian for original formatting and attached multimedia. Chief Complaint  f/u visit ,, need meds refill , c/o left hand  History of Present Illness  63-year-old? female with past medical history of hyperlipidemia, hypertension, hypothyroidism, and prediabetes mellitus type II.??Admits she is doing very well. Planning to move to [facility] to live with her daughter for about 2 months to clean out her storage unit and to help her daughter out.? 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 Denies any SI,HIs, anxiety, or depression. States she is continuing to watch what she eats and avoids sugar and high? carbohydrates food.? She is intentionally losing weight and monitoring her blood pressure. Denies any chest pain, syncope,?shortness of breath, abdominal pain,?tingling, or any weaknesses. ?Denies any? changes in appetite.  ?  Acute concerns: Pain and swelling in the left 4th metacarpal joint that has been present for approximately 6 months.?States she notice it is more stiff in the joint in the morning and states the finger catches sometimes and she hears a click.? Admits ibuprofen improves the pain.? Denies any fever, trauma, or recent hospitalization.?  ?   HTN: controlled. No HA, chest pain,?or dizziness or Blurry vision.?Currently on 10 mg Norvasc  ?   HLD:?Curretnylu on fenofibrate 10 mg nightly. tolerate medication without complications.? Changed diet significantly.? Increase in vegetable intake. Decreased sugar and carbohydrates intake.? Intentionally losing weight.  ?   Hypothyroidism: TSH and free T4 were within normal limits in July. She is on 100 mcg of Synthroid. No complaints of constipation, hair loss, or cold intolerance, tachycardia, or sweating.  ?   Arthritis: States her arthritis is under control.? No flare ups.?  ?   HM: DEXA completed on 9/21/18  Colonoscopy 11/24/14  WNL  Mammogram 01/11/2018  Tdap 11/11/14  PCV 13 7/13/18  Flu - pt refused the flu vaccine today. Risks and benefits discussed. ?she verbalized understanding.  Review of Systems  Constitutional: No fever, fatigue, weakness  Eye: No vision loss, blurred vision, or changes in vision  ENMT: No sore throat, ear pain, sinus pain/congestion, nasal congestion/drainage  Respiratory: No cough, no wheezing, no shortness of breath, no difficulty breathing  Cardiovascular: No chest pain, no palpitations, no edema  Gastrointestinal: No nausea, vomiting,constipation or diarrhea. No abdominal pain  Genitourinary no dysuria, no urinary frequency or urgency, no hematuria  Musculoskeletal:? PER HPI  Integumentary: No skin rash or abnormal lesion  Neurologic: No headache, no dizziness, no weakness or numbness  Physical Exam  Vitals & Measurements  T:?36.7? ?C (Oral)? HR:?81(Peripheral)? RR:?22? BP:?113/71?  HT:?174?cm? HT:?174?cm? WT:?64.10?kg? WT:?64.10?kg? BMI:?21.17?  Vital signs: Reviewed  General: Well-developed well-nourished in no acute distress  Eye: PERRLA, EOMI, clear conjunctiva, eyelids normal  Neck: Full range of motion, no thyromegaly or lymphadenopathy  Respiratory: Clear to auscultation bilaterally. No wheezes, ronchi, or rales  Cardiovascular: Regular rate and rhythm without murmurs, gallops or rubs  Gastrointestinal: Soft, non-tender, non-distended, without masses to palpation  Genitourinary: No CVA tenderness to palpation  Musculoskeletal: Herbedens node on the?DIP of right index and ring finger. Left 4th metacarpal joint is not swollen. No decrease in ROM.?Possible node in the?tendon of the left 4th metacarpal.?? Normal strength, No tenderness, No deformity.  Integumentary: No rashes or skin lesions present?  ?  ?  Assessment/Plan  1.?HLD (hyperlipidemia)(  Confirmed  )?E78.5,? Hyperlipidemia?E78.5  -Stable  -Compliant with medications, no side effects from medicaitons  -Current medications: Fenofibrate  120mg  -ASCVD risk: 10 year ASCVD risk of: 5.2% (calculated from 04/26/2018)  -Encourage? weight loss, physical activity, lifestyle modifications  -ED precautions given  ?  Ordered:  fenofibrate, 120 mg = 1 tab(s), Oral, Daily, # 90 tab(s), 3 Refill(s), Pharmacy: [facility]  Clinic Follow up, *Est. 06/14/19 13:45:00 CDT, Order for future visit, Hypertension, [facility]  Comprehensive Metabolic Panel, Routine collect, 12/11/18 8:08:00 CST, Blood, Stop date 12/11/18 8:08:00 CST, Lab Collect, Hypertension  HLD (hyperlipidemia)(  Confirmed  ), 12/11/18 8:08:00 CST  Hemoglobin A1C [facility], Routine collect, 12/11/18 8:08:00 CST, Blood, Stop date 12/11/18 8:08:00 CST, Lab Collect, Hypertension  HLD (hyperlipidemia)(  Confirmed  ), 12/11/18 8:08:00 CST  Lipid Panel, Routine collect, 12/11/18 8:08:00 CST, Blood, Stop date 12/11/18 8:08:00 CST, Lab Collect, Hypertension  HLD (hyperlipidemia)(  Confirmed  ), 12/11/18 8:08:00 CST  Thyroid Stimulating Hormone, Routine collect, 12/11/18 8:08:00 CST, Blood, Stop date 12/11/18 8:08:00 CST, Lab Collect, Hypertension  HLD (hyperlipidemia)(  Confirmed  ), 12/11/18 8:08:00 CST  ?  2.?Hypertension?I10  -Stable, well controlled, Blood pressure at goal ( <140/90)  -Compliant with medications, No side effects of medications.  -Current medications:?Amlodipine 10 mg daily  -Labs ordered today: CBC, CMP, FLP, HbA1c  -Encourage lifestyle modifications, consult nutrition for DASH diet, weight loss, regular aerobic exercise, decrease EtOH, avoid NSAIDs, take medications as prescribed  -Monitors BP at home.? SBP range:_, DBP range:_  -ED precautions given  ?  Ordered:  Clinic Follow up, *Est. 06/14/19 13:45:00 CDT, Order for future visit, Hypertension, [facility]  Comprehensive Metabolic Panel, Routine collect, 12/11/18 8:08:00 CST, Blood, Stop date 12/11/18 8:08:00 CST, Lab Collect, Hypertension  HLD (hyperlipidemia)(  Confirmed  ),  12/11/18 8:08:00 CST  Hemoglobin A1C [facility], Routine collect, 12/11/18 8:08:00 CST, Blood, Stop date 12/11/18 8:08:00 CST, Lab Collect, Hypertension  HLD (hyperlipidemia)(  Confirmed  ), 12/11/18 8:08:00 CST  Lipid Panel, Routine collect, 12/11/18 8:08:00 CST, Blood, Stop date 12/11/18 8:08:00 CST, Lab Collect, Hypertension  HLD (hyperlipidemia)(  Confirmed  ), 12/11/18 8:08:00 CST  Thyroid Stimulating Hormone, Routine collect, 12/11/18 8:08:00 CST, Blood, Stop date 12/11/18 8:08:00 CST, Lab Collect, Hypertension  HLD (hyperlipidemia)(  Confirmed  ), 12/11/18 8:08:00 CST  ?  3.?Hypothyroidism?E03.9  -Stable  -Current medications: levothyroxine 100mcg  -Discuss with patient to monitor energy level, temperature tolerance, hair thinning, and bowel movement regularity  -Continue to monitor TSH, adjust medication as necessary  -ED precautions given  ?  Ordered:  Clinic Follow up, *Est. 06/14/19 13:45:00 CDT, Order for future visit, Hypertension, [facility]  ?  4.?Arthritis?M19.90  ?-Stable  ?  5.?Trigger finger?M65.30  ?-Left 4th metacarpal.  -Educated the patient on trigger finger symptoms and management  -Continue to monitor closely  -ED precautions given  ?  Orders:  amitriptyline, 25 mg = 1 tab(s), Oral, Once a day (at bedtime), # 30 tab(s), 3 Refill(s), Pharmacy: [facility]  amLODIPine, 10 mg = 1 tab(s), Oral, Daily, # 90 tab(s), 3 Refill(s), Pharmacy: [facility]  RTC in 6 months for follow up   Problem List/Past Medical History  Ongoing  Cystocele, midline(  Confirmed  )  HLD (hyperlipidemia)(  Confirmed  )  Hypercholesterolemia  Hypertension  Historical  Acute back pain with sciatica  Fibrocystic changes  Hypothyroid  Seasonal allergy  Procedure/Surgical History  Appendectomy Laparoscopic (02/02/2016)  Colonoscopy (11/24/2014)  Breast lumpectomy  History of tonsillectomy  lumps removed from breast  Tonsillectomy   Medications  amitriptyline 25 mg oral tablet, 25  mg= 1 tab(s), Oral, Once a day (at bedtime), 3 refills  amlodipine 10 mg oral tablet, 10 mg= 1 tab(s), Oral, Daily, 3 refills  aspirin 81 mg oral tablet, 81 mg= 1 tab(s), Oral, Daily,? ?Not taking  fenofibrate 120 mg oral tablet, 120 mg= 1 tab(s), Oral, Daily, 3 refills  levothyroxine 100 mcg (0.1 mg) oral tablet, 100 mcg= 1 tab(s), Oral, Daily, 3 refills  Vitamin D 50,000 intl units oral capsule, 41951 IntUnit= 1 cap(s), Oral, Daily, 3 refills  Allergies  codeine?(C/O: itching)  Social History  Alcohol - Denies Alcohol Use, 11/24/2014  Never, 01/09/2017  Employment/School  Unemployed, 04/06/2015  Exercise  Exercise frequency: 3-4 times/week. Exercise type: Walking., 06/15/2015  Home/Environment  Lives with Spouse., 04/06/2015  Nutrition/Health  Calorie restricted, 06/15/2015  Sexual  Sexually active: No., 12/11/2018  Substance Abuse - Denies Substance Abuse, 11/24/2014  Never, 01/09/2017  Tobacco - Denies Tobacco Use, 11/24/2014  Never smoker, N/A, 12/11/2018  Never smoker Use:., 07/13/2018  Family History  Hypertension.: Mother.  Immunizations  Vaccine Date Status   pneumococcal 13-valent conjugate vaccine 07/13/2018 Given   influenza virus vaccine, inactivated 02/06/2017 Given   tetanus/diphtheria/pertussis, acel(Tdap) 11/11/2014 Recorded   influenza virus vaccine, inactivated 11/11/2014 Recorded   Health Maintenance  Health Maintenance  ???Pending?(in the next year)  ??? ??OverDue  ??? ? ? ?Pneumococcal Vaccine due??and every?  ??? ? ? ?Hypertension Management-Education due??09/23/17??and every 1??year(s)  ??? ? ? ?Aspirin Therapy for CVD Prevention due??03/16/18??and every 1??year(s)  ??? ??Due?  ??? ? ? ?ADL Screening due??12/11/18??and every 1??year(s)  ??? ? ? ?Alcohol Misuse Screening due??12/11/18??and every 1??year(s)  ??? ? ? ?Cognitive Screening due??12/11/18??and every 1??year(s)  ??? ? ? ?Functional Assessment due??12/11/18??and every 1??year(s)  ??? ? ? ?Geriatric Depression Screening  due??12/11/18??and every 1??year(s)  ??? ? ? ?Zoster Vaccine due??12/11/18??and every 100??year(s)  ??? ??Due In Future?  ??? ? ? ?Hypertension Management-BMP not due until??04/26/19??and every 1??year(s)  ??? ? ? ?Advance Directive not due until??09/21/19??and every 1??year(s)  ???Satisfied?(in the past 1 year)  ??? ??Satisfied?  ??? ? ? ?Advance Directive on??09/21/18.??Satisfied by Fanny Puckett  ??? ? ? ?Blood Pressure Screening on??12/11/18.??Satisfied by Leyla Joseph LPN  ??? ? ? ?Body Mass Index Check on??12/11/18.??Satisfied by Leyla Joseph LPN  ??? ? ? ?Bone Density Screening on??09/21/18.??Satisfied by Ileana Carvajal  ??? ? ? ?Breast Cancer Screening ([facility]) on??01/11/18.??Satisfied by Viki Benites  ??? ? ? ?Depression Screening on??12/11/18.??Satisfied by Leyla Joseph LPN  ??? ? ? ?Diabetes Screening on??04/26/18.??Satisfied by Roma Hill  ??? ? ? ?Fall Risk Assessment on??12/11/18.??Satisfied by Leyla Joseph LPN  ??? ? ? ?Hypertension Management-Blood Pressure on??12/11/18.??Satisfied by Leyla Joseph LPN  ??? ? ? ?Influenza Vaccine on??12/11/18.??Satisfied by Leyla Joseph LPN  ??? ? ? ?Lipid Screening on??04/26/18.??Satisfied by Roma Hill  ??? ? ? ?Obesity Screening on??12/11/18.??Satisfied by Leyla Joseph LPN  ??? ? ? ?Pneumococcal Vaccine on??07/13/18.??Satisfied by Mike Mckoy  ??? ? ? ?Smoking Cessation on??12/11/18.??Satisfied by Leyla Joseph LPN  ?  ?      Discussed with resident at time of visit.  Chronic co-morbidities as listed.? Compliant with medications; dietary intake modified.  Left hand digit / MCP painful; episodic sticking; no recent trauma.  ?  PE:  Gen: alert, healthy appearing. Pleasant.  HEENT: no thyromegaly; no carotid bruits.  Chest: lungs clear.  CV: reg. rhythm; no murmurs.  Ext: Left hand: + Heberdens nodes; No MCP, IP joint swelling; FAROM  ?  HPI / PE reviewed.?  Agree with assessment; plan of care appropriate.  Short course NSAIDs recommended if no contraindications; splinting if continued symptoms.

## 2022-05-03 NOTE — HISTORICAL [facility] CERNER
This is a historical note converted from Kal[facility]. Formatting and pictures may have been removed.  Please reference Kalyn for original formatting and attached multimedia. Chief Complaint  trouble sleeping  History of Present Illness  67yo white female PMHx hypothyroid, HTN presents for followup.  ?  Acute Issues: difficulty sleeping, will sleep for 2 hrs then wake ups; started taking melatonin up to 5mg and doing better with it; tracks her sleep with fitbit; does not drink caffeine after noon.  ?  HTN: bikes 6 miles a day, no issues, no headaches, taking meds as prescribed  Hypothyroid: feels like 88mcg is not enough, was doing well on 100mcg  Review of Systems  -fever, -chills  -Chest pain, -SOB  -n/v/d/c, -abdominal pain  Physical Exam  Vitals & Measurements  HR:?98(Peripheral)? RR:?18? BP:?117/71? SpO2:?98%?  HT:?175.00?cm? WT:?73.900?kg? BMI:?24.13?  General: appears well, in no acute distress  Respiratory: clear to auscultation bilaterally, nonlabored respirations  Cardiovascular: regular rate and rhythm without murmurs, no?pretibial edema  Assessment/Plan  1.?Hypothyroidism?E03.9  -TSH, T4 today  -continue levothyroxine 88mcg, will consider adjusting based on levels  Ordered:  Clinic Follow up, *Est. 01/12/22 3:00:00 CST, Order for future visit, Hypothyroidism  Hypertension  Insomnia, [facility]  Free T4, Routine collect, 10/12/21 9:52:00 CDT, Blood, Order for future visit, Stop date 10/12/21 9:52:00 CDT, Lab Collect, Hypothyroidism, 10/12/21 9:52:00 CDT  Thyroid Stimulating Hormone, Routine collect, 10/12/21 9:52:00 CDT, Blood, Order for future visit, Stop date 10/12/21 9:52:00 CDT, Lab Collect, Hypothyroidism, 10/12/21 9:52:00 CDT  Vitamin D, 25-Hydroxy Level, Routine collect, 10/12/21 9:52:00 CDT, Blood, Order for future visit, Stop date 10/12/21 9:52:00 CDT, Lab Collect, Hypothyroidism, 10/12/21 9:52:00 CDT  ?  2.?Hypertension?I10  -at goal  -continue amlodipine 10mg, refills sent  -education  given  Ordered:  Clinic Follow up, *Est. 01/12/22 3:00:00 CST, Order for future visit, Hypothyroidism  Hypertension  Insomnia, [facility]  ?  3.?Insomnia?G47.00  -continue melatonin can go up to 10mg at night  -advised no caffeine after 3pm, continue exercising daily  Ordered:  Clinic Follow up, *Est. 01/12/22 3:00:00 CST, Order for future visit, Hypothyroidism  Hypertension  Insomnia, [facility]  ?  Orders:  amitriptyline, 25 mg = 1 tab(s), Oral, Once a day (at bedtime), # 90 tab(s), 1 Refill(s), Pharmacy: [id number], 175, cm, Height/Length Dosing, 10/12/21 8:30:00 CDT, 73.9, kg, Weight Dosing, 10/12/21 8:30:00 CDT  amLODIPine, 10 mg = 1 tab(s), Oral, Daily, # 90 tab(s), 1 Refill(s), Pharmacy: [id number], 175, cm, Height/Length Dosing, 10/12/21 8:30:00 CDT, 73.9, kg, Weight Dosing, 10/12/21 8:30:00 CDT  atorvastatin, 40 mg = 1 tab(s), Oral, Daily, # 90 tab(s), 1 Refill(s), Pharmacy: [id number], 175, cm, Height/Length Dosing, 10/12/21 8:30:00 CDT, 73.9, kg, Weight Dosing, 10/12/21 8:30:00 CDT  levothyroxine, 88 mcg = 1 tab(s), Oral, Daily, # 30 tab(s), 1 Refill(s), Pharmacy: [id number], 175, cm, Height/Length Dosing, 10/12/21 8:30:00 CDT, 73.9, kg, Weight Dosing, 10/12/21 8:30:00 CDT  RTC 3 months  ?  Rufina Maldonado DO  [facility] Resident HO-3  Referrals  Clinic Follow up, *Est. 01/12/22 3:00:00 CST, Order for future visit, Hypothyroidism  Hypertension  Insomnia, [facility]   Medications  amitriptyline 25 mg oral tablet, 25 mg= 1 tab(s), Oral, Once a day (at bedtime), 1 refills  amlodipine 10 mg oral tablet, 10 mg= 1 tab(s), Oral, Daily, 1 refills  aspirin 81 mg oral tablet, 81 mg= 1 tab(s), Oral, Daily  atorvastatin 40 mg oral tablet, 40 mg= 1 tab(s), Oral, Daily, 1 refills  levothyroxine 88 mcg (0.088 mg) oral tablet, 88 mcg= 1 tab(s), Oral, Daily, 1 refills  Allergies  codeine?(C/O: itching)      Faculty addendum: Patient discussed with  resident. ?Chart was reviewed including vitals, labs, etc. Care provided reasonable and necessary.?I participated in the management of the patient and was immediately available throughout the encounter. Services were furnished in a primary care center located in the outpatient department of a [facility]. I agree with the residents findings and plan as documented in the residents note.?   English

## 2022-06-03 RX ORDER — AMITRIPTYLINE HYDROCHLORIDE 25 MG/1
25 TABLET, FILM COATED ORAL NIGHTLY PRN
Qty: 30 TABLET | Refills: 3 | Status: SHIPPED | OUTPATIENT
Start: 2022-06-03 | End: 2023-06-03

## 2022-06-03 RX ORDER — AMLODIPINE BESYLATE 10 MG/1
10 TABLET ORAL DAILY
Qty: 30 TABLET | Refills: 11 | Status: SHIPPED | OUTPATIENT
Start: 2022-06-03 | End: 2023-06-03

## 2022-06-03 RX ORDER — ATORVASTATIN CALCIUM 40 MG/1
40 TABLET, FILM COATED ORAL DAILY
Qty: 90 TABLET | Refills: 3 | Status: SHIPPED | OUTPATIENT
Start: 2022-06-03 | End: 2023-06-03

## 2022-06-03 RX ORDER — LEVOTHYROXINE SODIUM 88 UG/1
88 TABLET ORAL
Qty: 30 TABLET | Refills: 11 | Status: SHIPPED | OUTPATIENT
Start: 2022-06-03 | End: 2023-06-03

## 2022-10-27 ENCOUNTER — DOCUMENTATION ONLY (OUTPATIENT)
Dept: FAMILY MEDICINE | Facility: CLINIC | Age: 67
End: 2022-10-27
Payer: MEDICARE

## 2022-12-14 ENCOUNTER — DOCUMENTATION ONLY (OUTPATIENT)
Dept: PRIMARY CARE CLINIC | Facility: CLINIC | Age: 67
End: 2022-12-14
Payer: MEDICARE